# Patient Record
Sex: FEMALE
[De-identification: names, ages, dates, MRNs, and addresses within clinical notes are randomized per-mention and may not be internally consistent; named-entity substitution may affect disease eponyms.]

---

## 2020-04-09 PROBLEM — Z00.00 ENCOUNTER FOR PREVENTIVE HEALTH EXAMINATION: Status: ACTIVE | Noted: 2020-04-09

## 2020-04-25 ENCOUNTER — APPOINTMENT (OUTPATIENT)
Dept: OBGYN | Facility: CLINIC | Age: 25
End: 2020-04-25

## 2024-09-03 ENCOUNTER — APPOINTMENT (OUTPATIENT)
Dept: OBGYN | Facility: CLINIC | Age: 29
End: 2024-09-03
Payer: MEDICAID

## 2024-09-03 ENCOUNTER — LABORATORY RESULT (OUTPATIENT)
Age: 29
End: 2024-09-03

## 2024-09-03 VITALS
HEIGHT: 64 IN | BODY MASS INDEX: 23.9 KG/M2 | DIASTOLIC BLOOD PRESSURE: 60 MMHG | SYSTOLIC BLOOD PRESSURE: 110 MMHG | WEIGHT: 140 LBS

## 2024-09-03 DIAGNOSIS — N90.7 VULVAR CYST: ICD-10-CM

## 2024-09-03 DIAGNOSIS — Z78.9 OTHER SPECIFIED HEALTH STATUS: ICD-10-CM

## 2024-09-03 DIAGNOSIS — Z23 ENCOUNTER FOR IMMUNIZATION: ICD-10-CM

## 2024-09-03 DIAGNOSIS — N92.6 IRREGULAR MENSTRUATION, UNSPECIFIED: ICD-10-CM

## 2024-09-03 DIAGNOSIS — Z86.19 PERSONAL HISTORY OF OTHER INFECTIOUS AND PARASITIC DISEASES: ICD-10-CM

## 2024-09-03 DIAGNOSIS — Z12.4 ENCOUNTER FOR SCREENING FOR MALIGNANT NEOPLASM OF CERVIX: ICD-10-CM

## 2024-09-03 DIAGNOSIS — E28.2 POLYCYSTIC OVARIAN SYNDROME: ICD-10-CM

## 2024-09-03 DIAGNOSIS — R87.619 UNSPECIFIED ABNORMAL CYTOLOGICAL FINDINGS IN SPECIMENS FROM CERVIX UTERI: ICD-10-CM

## 2024-09-03 DIAGNOSIS — Z82.49 FAMILY HISTORY OF ISCHEMIC HEART DISEASE AND OTHER DISEASES OF THE CIRCULATORY SYSTEM: ICD-10-CM

## 2024-09-03 DIAGNOSIS — Z01.419 ENCOUNTER FOR GYNECOLOGICAL EXAMINATION (GENERAL) (ROUTINE) W/OUT ABNORMAL FINDINGS: ICD-10-CM

## 2024-09-03 LAB — HCG UR QL: NEGATIVE

## 2024-09-03 PROCEDURE — 99385 PREV VISIT NEW AGE 18-39: CPT

## 2024-09-03 PROCEDURE — 81025 URINE PREGNANCY TEST: CPT

## 2024-09-05 PROBLEM — Z78.9 DOES NOT USE ILLICIT DRUGS: Status: ACTIVE | Noted: 2024-09-05

## 2024-09-05 PROBLEM — Z82.49 FAMILY HISTORY OF ESSENTIAL HYPERTENSION: Status: ACTIVE | Noted: 2024-09-03

## 2024-09-05 PROBLEM — Z86.19 HISTORY OF HPV INFECTION: Status: RESOLVED | Noted: 2024-09-03 | Resolved: 2024-09-05

## 2024-09-05 PROBLEM — Z78.9 NON-SMOKER: Status: ACTIVE | Noted: 2024-09-03

## 2024-09-05 PROBLEM — Z78.9 SOCIAL ALCOHOL USE: Status: ACTIVE | Noted: 2024-09-05

## 2024-09-05 PROBLEM — Z23 ENCOUNTER FOR IMMUNIZATION: Status: ACTIVE | Noted: 2024-09-05 | Resolved: 2024-09-19

## 2024-09-05 LAB — HPV HIGH+LOW RISK DNA PNL CVX: DETECTED

## 2024-09-05 NOTE — HISTORY OF PRESENT ILLNESS
[Monogamous (Male Partner)] : is monogamous with a male partner [Y] : Patient uses contraception [Condoms] : uses condoms [PapSmeardate] : 2023 [TextBox_31] : HPV positive, abnormal (cytology unknown) [TextBox_102] : irregular [LMPDate] : 07/01/24 [PGxTotal] : 1 [[facility] CenterxF[facility]] : 1 [[facility] Hospitaliving] : 1 [FreeTextEntry1] : NVD x 1

## 2024-09-05 NOTE — COUNSELING
[Nutrition/ Exercise/ Weight Management] : nutrition, exercise, weight management [Vitamins/Supplements] : vitamins/supplements [Breast Self Exam] : breast self exam [Contraception/ Emergency Contraception/ Safe Sexual Practices] : contraception, emergency contraception, safe sexual practices [HPV Vaccine] : HPV Vaccine [Other ___] : [unfilled]

## 2024-09-05 NOTE — HISTORY OF PRESENT ILLNESS
[Monogamous (Male Partner)] : is monogamous with a male partner [Y] : Patient uses contraception [Condoms] : uses condoms [PapSmeardate] : 2023 [TextBox_31] : HPV positive, abnormal (cytology unknown) [TextBox_102] : irregular [LMPDate] : 07/01/24 [PGxTotal] : 1 [[facility] CenterxF[facility]] : 1 [[facility] Centeriving] : 1 [FreeTextEntry1] : NVD x 1

## 2024-09-05 NOTE — PHYSICAL EXAM
[Appropriately responsive] : appropriately responsive [Alert] : alert [No Acute Distress] : no acute distress [No Lymphadenopathy] : no lymphadenopathy [Regular Rate Rhythm] : regular rate rhythm [Soft] : soft [Non-tender] : non-tender [Non-distended] : non-distended [No HSM] : No HSM [No Lesions] : no lesions [No Mass] : no mass [Oriented x3] : oriented x3 [Examination Of The Breasts] : a normal appearance [Normal] : normal [No Masses] : no breast masses were palpable [FreeTextEntry5] : respirations even, unlabored. no dyspnea  [FreeTextEntry1] : Labia/Clitoris: right upper labia minora with pea sized lump, smooth, well circumscribed and mobile, no discoloration, non tender, no drainage c/w inclusion cyst, left labia wnl, no suspicious lesions bilaterally. Vagina: Normal, no lesions visible or palpable. no abnormal discharge Cervix: Smooth, pink, + ectopy/inflammation,  no discrete lesions. No cervical motion tenderness. PAP collected  Uterus: normal size and position mobile, nontender.  Adnexa: No palpable adnexal masses, nontender bilaterally.

## 2024-09-05 NOTE — DISCUSSION/SUMMARY
[FreeTextEntry1] : Well appearing female is here for routine gynecological exam; hx of HPV/ abnormal pap, had colposcopy done in [place] in March 2024, no biopsies were done. hx of irregular periods/PCOS, and reports recurrent right labial cyst w/o signs of abscess for which she declines treatment or intervention for at this time.   Abnormal PAP/HPV positive: - Pap done today, discussed abnormal pap and risks for cervical cancer, pt is a nonsmoker, reports having received the gardasil vaccine years ago.   Irregular periods/PCOS: - discussed implications of PCOS on overall health and fertility, patient hand out provided - patient declines BCM for period regulation - advised healthy low carb diet/ exercise.  - AUB precautions given.   Vulvar cyst: - right labia minora lump c/w inclusion cyst, no abscess - patient declines removal or intervention at this time - at home care reviewed, warm compresses, avoidance of rubbing area - Abscess precautions given  HCM: - PAP done - Healthy diet/exercise/SBE advised  RTO in 1 year for annual gyn exam and as directed.

## 2024-09-13 LAB — CYTOLOGY CVX/VAG DOC THIN PREP: ABNORMAL

## 2024-10-11 ENCOUNTER — APPOINTMENT (OUTPATIENT)
Dept: OBGYN | Facility: CLINIC | Age: 29
End: 2024-10-11
Payer: MEDICAID

## 2024-10-11 ENCOUNTER — TRANSCRIPTION ENCOUNTER (OUTPATIENT)
Age: 29
End: 2024-10-11

## 2024-10-11 VITALS
BODY MASS INDEX: 24.24 KG/M2 | DIASTOLIC BLOOD PRESSURE: 74 MMHG | HEIGHT: 64 IN | SYSTOLIC BLOOD PRESSURE: 128 MMHG | WEIGHT: 142 LBS

## 2024-10-11 DIAGNOSIS — R87.89 OTHER ABNORMAL FINDINGS IN SPECIMENS FROM FEMALE GENITAL ORGANS: ICD-10-CM

## 2024-10-11 DIAGNOSIS — R87.613 HIGH GRADE SQUAMOUS INTRAEPITHELIAL LESION ON CYTOLOGIC SMEAR OF CERVIX (HGSIL): ICD-10-CM

## 2024-10-11 LAB — HCG UR QL: NEGATIVE

## 2024-10-11 PROCEDURE — 81025 URINE PREGNANCY TEST: CPT

## 2024-10-11 PROCEDURE — 57454 BX/CURETT OF CERVIX W/SCOPE: CPT

## 2024-10-17 LAB — CORE LAB BIOPSY: NORMAL

## 2024-10-21 DIAGNOSIS — R87.613 HIGH GRADE SQUAMOUS INTRAEPITHELIAL LESION ON CYTOLOGIC SMEAR OF CERVIX (HGSIL): ICD-10-CM

## 2024-11-14 ENCOUNTER — APPOINTMENT (OUTPATIENT)
Dept: GYNECOLOGIC ONCOLOGY | Facility: CLINIC | Age: 29
End: 2024-11-14

## 2024-11-14 ENCOUNTER — NON-APPOINTMENT (OUTPATIENT)
Age: 29
End: 2024-11-14

## 2024-11-14 DIAGNOSIS — R87.613 HIGH GRADE SQUAMOUS INTRAEPITHELIAL LESION ON CYTOLOGIC SMEAR OF CERVIX (HGSIL): ICD-10-CM

## 2024-11-14 DIAGNOSIS — R87.89 OTHER ABNORMAL FINDINGS IN SPECIMENS FROM FEMALE GENITAL ORGANS: ICD-10-CM

## 2024-11-14 PROCEDURE — 99204 OFFICE O/P NEW MOD 45 MIN: CPT | Mod: 25

## 2024-11-14 PROCEDURE — 57452 EXAM OF CERVIX W/SCOPE: CPT | Mod: 59

## 2025-05-14 ENCOUNTER — APPOINTMENT (OUTPATIENT)
Dept: GYNECOLOGIC ONCOLOGY | Facility: CLINIC | Age: 30
End: 2025-05-14

## 2025-05-14 VITALS
DIASTOLIC BLOOD PRESSURE: 78 MMHG | RESPIRATION RATE: 16 BRPM | HEIGHT: 64 IN | WEIGHT: 141.09 LBS | SYSTOLIC BLOOD PRESSURE: 144 MMHG | BODY MASS INDEX: 24.09 KG/M2

## 2025-05-14 PROCEDURE — 57454 BX/CURETT OF CERVIX W/SCOPE: CPT | Mod: 59

## 2025-05-14 PROCEDURE — 99214 OFFICE O/P EST MOD 30 MIN: CPT | Mod: 25

## 2025-05-16 LAB
HCG UR QL: NEGATIVE
QUALITY CONTROL: NORMAL